# Patient Record
(demographics unavailable — no encounter records)

---

## 2025-02-06 NOTE — PHYSICAL EXAM
[Chaperone Present] : A chaperone was present in the examining room during all aspects of the physical examination [59856] : A chaperone was present during the pelvic exam. [FreeTextEntry2] : Fiorella [Appropriately responsive] : appropriately responsive [Alert] : alert [No Acute Distress] : no acute distress [Soft] : soft [Non-tender] : non-tender [Non-distended] : non-distended [No HSM] : No HSM [No Lesions] : no lesions [No Mass] : no mass [Oriented x3] : oriented x3 [Examination Of The Breasts] : a normal appearance [___cm] : a ~M [unfilled] ~Ucm superior lateral quadrant mass was palpated [No Masses] : no breast masses were palpable [Labia Majora] : normal [Labia Minora] : normal [Normal] : normal [Uterine Adnexae] : normal

## 2025-02-06 NOTE — HISTORY OF PRESENT ILLNESS
[FreeTextEntry1] : Pt is a 28-year-old who presents today for well woman exam. 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  LMP: 1/21/2025 POB: 1/21/2025 PGYN: Regular, denies abl pap  PMH: breast cyst  PSH: denies Med: denies All: NKDA  SH: denies FH: Maternal grandmother with breast cancer  Pt is sexually active with 1 partner and uses condoms. Pt is not interested in birth control at this time.   Pt has a left breast cyst which she had two benign ultrasounds April 2024 1.3cm sized cyst

## 2025-02-06 NOTE — PLAN
[FreeTextEntry1] : Advised pt to monitor right breast cyst for changes. Pt would like to defer additional imaging.

## 2025-02-06 NOTE — DISCUSSION/SUMMARY
[FreeTextEntry1] : I spent the time noted on the day of this patient encounter preparing for, providing and documenting the above service. I have  counseled and educated the patient on the differential, workup, disease course, and treatment/management plan. Education was provided to the patient during this encounter. All questions and concerns were answered and addressed in detail.  Marva Frank NP, FNP-BC